# Patient Record
Sex: FEMALE | Race: WHITE | NOT HISPANIC OR LATINO | Employment: UNEMPLOYED | URBAN - METROPOLITAN AREA
[De-identification: names, ages, dates, MRNs, and addresses within clinical notes are randomized per-mention and may not be internally consistent; named-entity substitution may affect disease eponyms.]

---

## 2023-07-11 LAB
ALANINE AMINOTRANSFERASE (SGPT) (U/L) IN SER/PLAS: 17 U/L (ref 7–45)
ALBUMIN (G/DL) IN SER/PLAS: 4.1 G/DL (ref 3.4–5)
ALKALINE PHOSPHATASE (U/L) IN SER/PLAS: 85 U/L (ref 33–136)
ANION GAP IN SER/PLAS: 11 MMOL/L (ref 10–20)
APPEARANCE, URINE: CLEAR
ASPARTATE AMINOTRANSFERASE (SGOT) (U/L) IN SER/PLAS: 17 U/L (ref 9–39)
BACTERIA, URINE: ABNORMAL /HPF
BASOPHILS (10*3/UL) IN BLOOD BY AUTOMATED COUNT: 0.03 X10E9/L (ref 0–0.1)
BASOPHILS/100 LEUKOCYTES IN BLOOD BY AUTOMATED COUNT: 0.5 % (ref 0–2)
BILIRUBIN TOTAL (MG/DL) IN SER/PLAS: 0.8 MG/DL (ref 0–1.2)
BILIRUBIN, URINE: NEGATIVE
BLOOD, URINE: NEGATIVE
CALCIDIOL (25 OH VITAMIN D3) (NG/ML) IN SER/PLAS: 36 NG/ML
CALCIUM (MG/DL) IN SER/PLAS: 9.3 MG/DL (ref 8.6–10.6)
CARBON DIOXIDE, TOTAL (MMOL/L) IN SER/PLAS: 29 MMOL/L (ref 21–32)
CHLORIDE (MMOL/L) IN SER/PLAS: 102 MMOL/L (ref 98–107)
CHOLESTEROL (MG/DL) IN SER/PLAS: 175 MG/DL (ref 0–199)
CHOLESTEROL IN HDL (MG/DL) IN SER/PLAS: 50.9 MG/DL
CHOLESTEROL/HDL RATIO: 3.4
COLOR, URINE: ABNORMAL
CREATININE (MG/DL) IN SER/PLAS: 0.78 MG/DL (ref 0.5–1.05)
EOSINOPHILS (10*3/UL) IN BLOOD BY AUTOMATED COUNT: 0.16 X10E9/L (ref 0–0.7)
EOSINOPHILS/100 LEUKOCYTES IN BLOOD BY AUTOMATED COUNT: 2.8 % (ref 0–6)
ERYTHROCYTE DISTRIBUTION WIDTH (RATIO) BY AUTOMATED COUNT: 12.8 % (ref 11.5–14.5)
ERYTHROCYTE MEAN CORPUSCULAR HEMOGLOBIN CONCENTRATION (G/DL) BY AUTOMATED: 32.8 G/DL (ref 32–36)
ERYTHROCYTE MEAN CORPUSCULAR VOLUME (FL) BY AUTOMATED COUNT: 94 FL (ref 80–100)
ERYTHROCYTES (10*6/UL) IN BLOOD BY AUTOMATED COUNT: 4.11 X10E12/L (ref 4–5.2)
GFR FEMALE: 87 ML/MIN/1.73M2
GLUCOSE (MG/DL) IN SER/PLAS: 92 MG/DL (ref 74–99)
GLUCOSE, URINE: NEGATIVE MG/DL
HEMATOCRIT (%) IN BLOOD BY AUTOMATED COUNT: 38.7 % (ref 36–46)
HEMOGLOBIN (G/DL) IN BLOOD: 12.7 G/DL (ref 12–16)
IMMATURE GRANULOCYTES/100 LEUKOCYTES IN BLOOD BY AUTOMATED COUNT: 0.2 % (ref 0–0.9)
KETONES, URINE: NEGATIVE MG/DL
LDL: 91 MG/DL (ref 0–99)
LEUKOCYTE ESTERASE, URINE: ABNORMAL
LEUKOCYTES (10*3/UL) IN BLOOD BY AUTOMATED COUNT: 5.8 X10E9/L (ref 4.4–11.3)
LYMPHOCYTES (10*3/UL) IN BLOOD BY AUTOMATED COUNT: 2.68 X10E9/L (ref 1.2–4.8)
LYMPHOCYTES/100 LEUKOCYTES IN BLOOD BY AUTOMATED COUNT: 46.4 % (ref 13–44)
MONOCYTES (10*3/UL) IN BLOOD BY AUTOMATED COUNT: 0.5 X10E9/L (ref 0.1–1)
MONOCYTES/100 LEUKOCYTES IN BLOOD BY AUTOMATED COUNT: 8.7 % (ref 2–10)
NEUTROPHILS (10*3/UL) IN BLOOD BY AUTOMATED COUNT: 2.39 X10E9/L (ref 1.2–7.7)
NEUTROPHILS/100 LEUKOCYTES IN BLOOD BY AUTOMATED COUNT: 41.4 % (ref 40–80)
NITRITE, URINE: NEGATIVE
NRBC (PER 100 WBCS) BY AUTOMATED COUNT: 0 /100 WBC (ref 0–0)
PH, URINE: 7 (ref 5–8)
PLATELETS (10*3/UL) IN BLOOD AUTOMATED COUNT: 219 X10E9/L (ref 150–450)
POTASSIUM (MMOL/L) IN SER/PLAS: 4.3 MMOL/L (ref 3.5–5.3)
PROTEIN TOTAL: 6.9 G/DL (ref 6.4–8.2)
PROTEIN, URINE: NEGATIVE MG/DL
RBC, URINE: 1 /HPF (ref 0–5)
SODIUM (MMOL/L) IN SER/PLAS: 138 MMOL/L (ref 136–145)
SPECIFIC GRAVITY, URINE: 1 (ref 1–1.03)
SQUAMOUS EPITHELIAL CELLS, URINE: <1 /HPF
THYROTROPIN (MIU/L) IN SER/PLAS BY DETECTION LIMIT <= 0.05 MIU/L: 1.96 MIU/L (ref 0.44–3.98)
TRIGLYCERIDE (MG/DL) IN SER/PLAS: 168 MG/DL (ref 0–149)
UREA NITROGEN (MG/DL) IN SER/PLAS: 14 MG/DL (ref 6–23)
UROBILINOGEN, URINE: <2 MG/DL (ref 0–1.9)
VLDL: 34 MG/DL (ref 0–40)
WBC, URINE: 1 /HPF (ref 0–5)

## 2023-07-15 LAB — URINE CULTURE: ABNORMAL

## 2023-07-26 LAB
APPEARANCE, URINE: CLEAR
BACTERIA, URINE: ABNORMAL /HPF
BILIRUBIN, URINE: NEGATIVE
BLOOD, URINE: ABNORMAL
COLOR, URINE: YELLOW
GLUCOSE, URINE: NEGATIVE MG/DL
KETONES, URINE: NEGATIVE MG/DL
LEUKOCYTE ESTERASE, URINE: ABNORMAL
MUCUS, URINE: ABNORMAL /LPF
NITRITE, URINE: NEGATIVE
PH, URINE: 7 (ref 5–8)
PROTEIN, URINE: NEGATIVE MG/DL
RBC, URINE: 29 /HPF (ref 0–5)
SPECIFIC GRAVITY, URINE: 1.01 (ref 1–1.03)
UROBILINOGEN, URINE: <2 MG/DL (ref 0–1.9)
WBC, URINE: 7 /HPF (ref 0–5)

## 2023-07-28 LAB — URINE CULTURE: ABNORMAL

## 2023-08-16 LAB
APPEARANCE, URINE: CLEAR
BILIRUBIN, URINE: NEGATIVE
BLOOD, URINE: NEGATIVE
COLOR, URINE: YELLOW
GLUCOSE, URINE: NEGATIVE MG/DL
KETONES, URINE: NEGATIVE MG/DL
LEUKOCYTE ESTERASE, URINE: NEGATIVE
NITRITE, URINE: NEGATIVE
PH, URINE: 7 (ref 5–8)
PROTEIN, URINE: NEGATIVE MG/DL
SPECIFIC GRAVITY, URINE: 1.01 (ref 1–1.03)
UROBILINOGEN, URINE: <2 MG/DL (ref 0–1.9)

## 2023-08-17 LAB — URINE CULTURE: NORMAL

## 2023-11-07 ENCOUNTER — LAB (OUTPATIENT)
Dept: LAB | Facility: LAB | Age: 58
End: 2023-11-07
Payer: COMMERCIAL

## 2023-11-07 DIAGNOSIS — E55.9 VITAMIN D DEFICIENCY, UNSPECIFIED: Primary | ICD-10-CM

## 2023-11-07 LAB
25(OH)D3 SERPL-MCNC: 32 NG/ML (ref 30–100)
ALBUMIN SERPL BCP-MCNC: 4.5 G/DL (ref 3.4–5)
ALP SERPL-CCNC: 78 U/L (ref 33–136)
ALT SERPL W P-5'-P-CCNC: 19 U/L (ref 7–45)
ANION GAP SERPL CALC-SCNC: 15 MMOL/L (ref 10–20)
AST SERPL W P-5'-P-CCNC: 17 U/L (ref 9–39)
BILIRUB SERPL-MCNC: 0.9 MG/DL (ref 0–1.2)
BUN SERPL-MCNC: 13 MG/DL (ref 6–23)
CALCIUM SERPL-MCNC: 9.7 MG/DL (ref 8.6–10.6)
CHLORIDE SERPL-SCNC: 103 MMOL/L (ref 98–107)
CHOLEST SERPL-MCNC: 170 MG/DL (ref 0–199)
CHOLESTEROL/HDL RATIO: 3.2
CO2 SERPL-SCNC: 28 MMOL/L (ref 21–32)
CREAT SERPL-MCNC: 0.74 MG/DL (ref 0.5–1.05)
GFR SERPL CREATININE-BSD FRML MDRD: >90 ML/MIN/1.73M*2
GLUCOSE SERPL-MCNC: 92 MG/DL (ref 74–99)
HDLC SERPL-MCNC: 52.7 MG/DL
LDLC SERPL CALC-MCNC: 84 MG/DL
NON HDL CHOLESTEROL: 117 MG/DL (ref 0–149)
POTASSIUM SERPL-SCNC: 4.6 MMOL/L (ref 3.5–5.3)
PROT SERPL-MCNC: 7.2 G/DL (ref 6.4–8.2)
SODIUM SERPL-SCNC: 141 MMOL/L (ref 136–145)
TRIGL SERPL-MCNC: 168 MG/DL (ref 0–149)
VLDL: 34 MG/DL (ref 0–40)

## 2023-11-07 PROCEDURE — 36415 COLL VENOUS BLD VENIPUNCTURE: CPT

## 2023-11-07 PROCEDURE — 82306 VITAMIN D 25 HYDROXY: CPT

## 2023-11-07 PROCEDURE — 80061 LIPID PANEL: CPT

## 2023-11-07 PROCEDURE — 80053 COMPREHEN METABOLIC PANEL: CPT

## 2023-11-09 ENCOUNTER — OFFICE VISIT (OUTPATIENT)
Dept: PRIMARY CARE | Facility: CLINIC | Age: 58
End: 2023-11-09
Payer: COMMERCIAL

## 2023-11-09 VITALS — DIASTOLIC BLOOD PRESSURE: 82 MMHG | BODY MASS INDEX: 30.08 KG/M2 | SYSTOLIC BLOOD PRESSURE: 124 MMHG | WEIGHT: 178 LBS

## 2023-11-09 DIAGNOSIS — G47.9 SLEEP DISTURBANCE: Primary | ICD-10-CM

## 2023-11-09 DIAGNOSIS — E78.5 HYPERLIPIDEMIA, UNSPECIFIED HYPERLIPIDEMIA TYPE: ICD-10-CM

## 2023-11-09 DIAGNOSIS — E55.9 VITAMIN D DEFICIENCY: ICD-10-CM

## 2023-11-09 PROCEDURE — 1036F TOBACCO NON-USER: CPT | Performed by: INTERNAL MEDICINE

## 2023-11-09 PROCEDURE — 99214 OFFICE O/P EST MOD 30 MIN: CPT | Performed by: INTERNAL MEDICINE

## 2023-11-09 RX ORDER — CETIRIZINE HYDROCHLORIDE 10 MG/1
10 TABLET, CHEWABLE ORAL
COMMUNITY
Start: 2013-01-31

## 2023-11-09 RX ORDER — ERGOCALCIFEROL 1.25 MG/1
50000 CAPSULE ORAL
Qty: 12 CAPSULE | Refills: 0 | Status: SHIPPED | OUTPATIENT
Start: 2023-11-09 | End: 2024-01-03

## 2023-11-09 RX ORDER — TRAZODONE HYDROCHLORIDE 50 MG/1
50 TABLET ORAL NIGHTLY
COMMUNITY
Start: 2023-11-01 | End: 2024-03-07

## 2023-11-09 RX ORDER — FLUNISOLIDE 0.25 MG/ML
SOLUTION NASAL
COMMUNITY
Start: 2019-04-12

## 2023-11-09 RX ORDER — MULTIVITAMIN
TABLET ORAL
COMMUNITY

## 2023-11-09 RX ORDER — FLUTICASONE PROPIONATE 50 MCG
2 SPRAY, SUSPENSION (ML) NASAL
COMMUNITY
Start: 2013-04-22

## 2023-11-09 RX ORDER — METRONIDAZOLE 7.5 MG/G
LOTION TOPICAL
COMMUNITY
Start: 2019-03-08

## 2023-11-09 NOTE — PROGRESS NOTES
Subjective   Patient ID: Monique Guerrero is a 58 y.o. female who presents for No chief complaint on file..    HPI  Patient in for a visit  Gets over 10,000 steps a day   Dtr having difficulty with SADD had to pick her up from her program she was out of sorts , and her parents are ailing as well  Works for her  CPA   Vit d low already on mvi    Review of Systems  General: Denies fever, chills, night sweats,  Eyes: Negative for recent visual changes  Ears, Nose, Throat :  Negative for hearing changes, sinus discomfort  Dermatologic: Negative for new skin conditions, rash  Respiratory: Negative for wheezing, shortness of breath, cough  Cardiovascular: Negative for chest pain, palpitations, or leg swelling  Gastrointestinal: Negative for nausea/vomiting, abdominal pain, changes in bowel habits  Genitourinary URINARY INCONTINENCE except with coughing sneezing intermittently  urgency , frequency, discomfort   Musculoskeletal: see hpi  Neurological: Negative for headaches, dizziness    Previous history  Past Medical History:   Diagnosis Date    Allergic rhinitis, unspecified 05/08/2020    Allergic rhinitis    Chronic rhinitis 05/08/2020    Rhinitis    Chronic rhinitis     Rhinitis, chronic    COVID-19 12/01/2022    COVID-19 virus infection    Encounter for immunization 11/13/2020    Need for shingles vaccine    Overweight 01/14/2022    Overweight    Personal history of other diseases of the circulatory system     History of varicose veins of lower extremity    Personal history of other malignant neoplasm of skin     History of basal cell carcinoma (BCC)    Personal history of other medical treatment     History of mammogram    Personal history of other venous thrombosis and embolism     H/O blood clots    Pure hyperglyceridemia 01/14/2022    High triglycerides    Rosacea, unspecified 01/14/2022    Rosacea    Varicella without complication     Varicella without complication     Past Surgical History:   Procedure  Laterality Date     SECTION, CLASSIC  2014     Section    OTHER SURGICAL HISTORY  2017    Venous Sclerosing By Injection    OTHER SURGICAL HISTORY  2018    Endovenous Ablation Of Incompetent Vein Laser    OTHER SURGICAL HISTORY  2017    Leg Repair     Social History     Tobacco Use    Smoking status: Never    Smokeless tobacco: Never     No family history on file.  Allergies   Allergen Reactions    Delsym Rash    Dextromethorphan Hbr Rash    Pseudoephedrine Rash     fever, rash     Current Outpatient Medications   Medication Instructions    CALCIUM CITRATE-VITAMIN D3 ORAL oral    cetirizine (ZYRTEC) 10 mg, oral, Daily RT    fish oil concentrate (Omega-3) 120-180 mg capsule oral    flunisolide (Nasalide) 25 mcg (0.025 %) spray,non-aerosol nasal, Daily RT    fluticasone (Flonase) 50 mcg/actuation nasal spray 2 sprays, Does not apply, Daily RT    L. acidophilus/Bifid. animalis 32 billion cell capsule oral    metroNIDAZOLE (Metrolotion) 0.75 % lotion lotion Apply sparingly twice daily.    multivitamin tablet oral       Objective       Physical Exam  Vital Signs: as recorded above  General: Well groomed, well nourished   Orientation:  Alert , oriented to time, place , and person   Mood and Affect:  Cooperative , no apparent distress normal affect  Skin: Good color, good turgor  Eyes: Extra ocular muscle movements intact, anicteric sclerae  Neck: Supple, full range of movement  Chest: Normal breath sounds, normal chest wall exam, symmetric, good air entry, clear to auscultation  Heart: Regular rate and rhythm, without murmur, gallop, or rubs  BACK:  no CTLS spine tenderness, no flank tenderness  Extremities: full range of movement  bilateral UE and bilateral LE,  no lower extremity edema  Neurological: Alert, oriented, cranial nerves II-XII intact except for visual acuity  Sensation:  Intact   Gait: normal steady      Assessment/Plan   Monique Guerrero is a 58 y.o. female who  presents for the concerns below:    Problem List Items Addressed This Visit    None    HYPERCHOLESTEROLEMIA PLAN: stable  continue current fish oil   Follow low cholesterol diet, triglycerides 168  encouraged high omega 3 fatty acid intake in diet, exercise, weight control. . Will recheck / Obtain AST/ALT, fasting lipid profileCoenzyme Q10 200 mg a day if able to help increase HDL.    Skin changes rosacea just saw derm DR Jaime in October     Tdap   VITAMIN D DEFICIENCY low levels ,will put in order for high dose weekly 50,000 IU , then take Vitamin D3 2000 iu daily after 3 month course,   recheck vitamin D level in 4-5 months if below 26 will restart high dose again for another course , discussed other supplements or medications that may go through liver  follow-up as planned         Discussed with:   Return in : 4 months, labs to do prior to that visit     Portions of this note were generated using digital voice recognition software, and may contain grammatical errors       Aurora Winn MD  11/09/23  9:46 AM

## 2023-11-30 ENCOUNTER — OFFICE VISIT (OUTPATIENT)
Dept: OBSTETRICS AND GYNECOLOGY | Facility: CLINIC | Age: 58
End: 2023-11-30
Payer: COMMERCIAL

## 2023-11-30 VITALS
BODY MASS INDEX: 30.32 KG/M2 | DIASTOLIC BLOOD PRESSURE: 80 MMHG | HEIGHT: 65 IN | WEIGHT: 182 LBS | SYSTOLIC BLOOD PRESSURE: 132 MMHG

## 2023-11-30 DIAGNOSIS — Z12.31 ENCOUNTER FOR SCREENING MAMMOGRAM FOR BREAST CANCER: ICD-10-CM

## 2023-11-30 DIAGNOSIS — Z01.419 ENCOUNTER FOR WELL WOMAN EXAM WITH ROUTINE GYNECOLOGICAL EXAM: Primary | ICD-10-CM

## 2023-11-30 PROCEDURE — 99396 PREV VISIT EST AGE 40-64: CPT | Performed by: OBSTETRICS & GYNECOLOGY

## 2023-11-30 PROCEDURE — 1036F TOBACCO NON-USER: CPT | Performed by: OBSTETRICS & GYNECOLOGY

## 2023-11-30 ASSESSMENT — ENCOUNTER SYMPTOMS
CONSTITUTIONAL NEGATIVE: 1
RESPIRATORY NEGATIVE: 1
ENDOCRINE NEGATIVE: 1
CARDIOVASCULAR NEGATIVE: 1
SLEEP DISTURBANCE: 1
MUSCULOSKELETAL NEGATIVE: 1
NEUROLOGICAL NEGATIVE: 1
EYES NEGATIVE: 1
HEMATOLOGIC/LYMPHATIC NEGATIVE: 1
GASTROINTESTINAL NEGATIVE: 1

## 2023-11-30 NOTE — PROGRESS NOTES
HPI    PAP 7-7-22 NEG HPV NEG  MAMMO 7-21-22  DEXA NEVER  COLON 6-21-18  Last edited by Onofre Nino MA on 11/30/2023  2:46 PM.

## 2023-11-30 NOTE — PROGRESS NOTES
"PAP 22 NEG HPV NEG   MAMMO 22   DEXA NEVER   COLON 18     SUBJECTIVE    HPI        59 yo  for routine well woman visit.    Last visit 2022 for RA.  Menopausal since age 48. Denies any bleeding.  Denies any other intervening medical or surgical issues.   , no smoke, occ ETOH.  Stressors related to aging parents and health issues.     Review of Systems   Constitutional: Negative.  Unexpected weight change: wt gain.   HENT: Negative.     Eyes: Negative.    Respiratory: Negative.     Cardiovascular: Negative.    Gastrointestinal: Negative.    Endocrine: Negative.    Genitourinary: Negative.    Musculoskeletal: Negative.    Skin: Negative.    Neurological: Negative.    Hematological: Negative.    Psychiatric/Behavioral:  Positive for sleep disturbance.    All other systems reviewed and are negative.    OBJECTIVE    /80   Ht 1.638 m (5' 4.5\")   Wt 82.6 kg (182 lb)   BMI 30.76 kg/m²     Physical Exam     Constitutional: Alert and in no acute distress. Well developed, well nourished   Head and Face: Head and face: normal   Eyes: Normal external exam - nonicteric sclera, extraocular movements intact (EOMI) and no ptosis.   Ears, Nose, Mouth, and Throat: External inspection of ears and nose: normal   Neck: no neck asymmetry. Supple and thyroid not enlarged and there were no palpable thyroid nodules   Cardiovascular: Heart rate and rhythm were normal, normal S1 and S2, no gallops, and no murmurs   Pulmonary: No respiratory distress and clear bilateral breath sounds   Chest: Breasts: normal appearance, no nipple discharge and no skin changes and palpation of breasts and axillae: no palpable mass and no axillary lymphadenopathy   Abdomen: soft nontender; no abdominal mass palpated, no organomegaly and no hernias   Genitourinary: external genitalia: normal, no inguinal lymphadenopathy, Bartholin's urethral and [place]'s glands: normal, urethra: normal, bladder: normal on palpation and perianal " area: normal   Vagina: normal.   Cervix: Normal appearing without lesions.  Uterus: Normal, mobile, nontender.  Right Adnexa/parametria: Normal.   Left Adnexa/parametria: Normal.   Skin: normal skin color and pigmentation, normal skin turgor, and no rash.   Psychiatric: alert and oriented x 3., affect normal to patient baseline and mood: appropriate        ASSESSMENT/PLAN  1. Encounter for well woman exam with routine gynecological exam  No pap done. Last pap 2022 was normal and HPV negative.    2. Encounter for screening mammogram for breast cancer  - BI mammo bilateral screening tomosynthesis;     Declines bone density at this time.     Lillie Beck MD

## 2023-12-08 ENCOUNTER — ANCILLARY PROCEDURE (OUTPATIENT)
Dept: RADIOLOGY | Facility: CLINIC | Age: 58
End: 2023-12-08
Payer: COMMERCIAL

## 2023-12-08 DIAGNOSIS — Z12.31 ENCOUNTER FOR SCREENING MAMMOGRAM FOR BREAST CANCER: ICD-10-CM

## 2023-12-08 PROCEDURE — 77067 SCR MAMMO BI INCL CAD: CPT | Performed by: STUDENT IN AN ORGANIZED HEALTH CARE EDUCATION/TRAINING PROGRAM

## 2023-12-08 PROCEDURE — 77063 BREAST TOMOSYNTHESIS BI: CPT

## 2023-12-08 PROCEDURE — 77063 BREAST TOMOSYNTHESIS BI: CPT | Performed by: STUDENT IN AN ORGANIZED HEALTH CARE EDUCATION/TRAINING PROGRAM

## 2024-01-02 DIAGNOSIS — E55.9 VITAMIN D DEFICIENCY: ICD-10-CM

## 2024-01-03 RX ORDER — ERGOCALCIFEROL 1.25 MG/1
50000 CAPSULE ORAL
Qty: 12 CAPSULE | Refills: 0 | Status: SHIPPED | OUTPATIENT
Start: 2024-01-03 | End: 2024-04-19

## 2024-02-29 ENCOUNTER — HOSPITAL ENCOUNTER (OUTPATIENT)
Dept: RADIOLOGY | Facility: CLINIC | Age: 59
Discharge: HOME | End: 2024-02-29
Payer: COMMERCIAL

## 2024-02-29 ENCOUNTER — OFFICE VISIT (OUTPATIENT)
Dept: ORTHOPEDIC SURGERY | Facility: CLINIC | Age: 59
End: 2024-02-29
Payer: COMMERCIAL

## 2024-02-29 VITALS — HEIGHT: 64 IN | WEIGHT: 188.4 LBS | BODY MASS INDEX: 32.17 KG/M2

## 2024-02-29 DIAGNOSIS — M25.571 RIGHT ANKLE PAIN, UNSPECIFIED CHRONICITY: ICD-10-CM

## 2024-02-29 DIAGNOSIS — M76.70 PERONEAL TENDINITIS, UNSPECIFIED LATERALITY: Primary | ICD-10-CM

## 2024-02-29 PROCEDURE — 73610 X-RAY EXAM OF ANKLE: CPT | Mod: RIGHT SIDE | Performed by: RADIOLOGY

## 2024-02-29 PROCEDURE — 73610 X-RAY EXAM OF ANKLE: CPT | Mod: RT

## 2024-02-29 PROCEDURE — 1036F TOBACCO NON-USER: CPT | Performed by: ORTHOPAEDIC SURGERY

## 2024-02-29 PROCEDURE — 99214 OFFICE O/P EST MOD 30 MIN: CPT | Performed by: ORTHOPAEDIC SURGERY

## 2024-02-29 PROCEDURE — 99204 OFFICE O/P NEW MOD 45 MIN: CPT | Performed by: ORTHOPAEDIC SURGERY

## 2024-02-29 ASSESSMENT — PAIN - FUNCTIONAL ASSESSMENT: PAIN_FUNCTIONAL_ASSESSMENT: 0-10

## 2024-02-29 ASSESSMENT — PAIN SCALES - GENERAL: PAINLEVEL_OUTOF10: 7

## 2024-02-29 ASSESSMENT — PAIN DESCRIPTION - DESCRIPTORS: DESCRIPTORS: ACHING;THROBBING

## 2024-02-29 NOTE — PROGRESS NOTES
Patient was reviewed and discussed with NICHOLE and/or orthopedic resident.  Patient was seen and evaluated in conjunction with NICHOLE and/or orthopedic resident. Findings and treatment plan were discussed and approved by myself, Dr. Schmidt.    On exam:  WD/WN overweight female  A+O X3  NAD  No lymphedema  Inspection of both feet and ankles show symmetric arches.   Able to STR bilaterally.   5/5 strength in all 4 planes.   Pain over peroneal tendons with inversion of right hindfoot.  Mild swelling over peroneal sheath distally.  No reproducible pain.  Sensation intact to LT.   Good pulses.   Stable anterior drawer.  No peroneal subluxation.    (-) [place].     I personally reviewed the following radiographic exams: Right ankle unremarkable.    Assessment: Right ankle pain, apparent peroneal tendon dysfunction.    Plan: Discussed nonoperative and operative options in detail.   Risk and benefits discussed in detail. All questions answered today.  Recovery timeline and expectations discussed in detail.  Reassured patient.  Recommend course of anti-inflammatory for the next few weeks.  Continue ice.  Recommend a course of physical therapy.  Discussed possible advanced imaging if pain continues.

## 2024-02-29 NOTE — PROGRESS NOTES
HPI:  58 y/o female presenting with 3 week history of right ankle pain localized laterally just distal and posterior to the lateral malleolus. She denies any specific injury but thinks she may have overused the ankle while exercising and walking her dog. She has a history of multiple right ankle sprains remotely but no recent injuries or issues with the ankle. She has tried resting and icing the ankle without sustained relief. She has not tried PT or NSAIDs at this time.    Exam: right ankle  - skin intact  - painless dorsiflexion, plantarflexion, eversion and inversion with 5/5 strength  - mild pain with resisted inversion  - non-tender to palpation   - able to do unassisted heel raise  - stable anterior and posterior drawer  - palpable DP pulse  - sensation intact throughout

## 2024-03-06 DIAGNOSIS — G47.9 SLEEP DISORDER, UNSPECIFIED: ICD-10-CM

## 2024-03-07 ENCOUNTER — APPOINTMENT (OUTPATIENT)
Dept: PRIMARY CARE | Facility: CLINIC | Age: 59
End: 2024-03-07
Payer: COMMERCIAL

## 2024-03-07 RX ORDER — TRAZODONE HYDROCHLORIDE 50 MG/1
50 TABLET ORAL NIGHTLY
Qty: 90 TABLET | Refills: 0 | Status: SHIPPED | OUTPATIENT
Start: 2024-03-07

## 2024-03-27 ENCOUNTER — EVALUATION (OUTPATIENT)
Dept: PHYSICAL THERAPY | Facility: CLINIC | Age: 59
End: 2024-03-27
Payer: COMMERCIAL

## 2024-03-27 DIAGNOSIS — M76.70 PERONEAL TENDINITIS, UNSPECIFIED LATERALITY: ICD-10-CM

## 2024-03-27 PROCEDURE — 97110 THERAPEUTIC EXERCISES: CPT | Mod: GP | Performed by: PHYSICAL THERAPIST

## 2024-03-27 PROCEDURE — 97161 PT EVAL LOW COMPLEX 20 MIN: CPT | Mod: GP | Performed by: PHYSICAL THERAPIST

## 2024-03-27 ASSESSMENT — ENCOUNTER SYMPTOMS
LOSS OF SENSATION IN FEET: 0
OCCASIONAL FEELINGS OF UNSTEADINESS: 0
DEPRESSION: 0

## 2024-03-27 NOTE — PROGRESS NOTES
Physical Therapy    Physical Therapy Evaluation and Treatment      Patient Name: Monique Guerrero  MRN: [id number]  Today's Date: 3/27/2024  Visit: 1  Insurance: Reviewed  Physician: Manuel Schmidt  PT Evaluation Time Entry  PT Evaluation (Low) Time Entry: 25  PT Therapeutic Procedures Time Entry  Therapeutic Exercise Time Entry: 10  Time Calculation  Start Time: 1120  Stop Time: 1155  Time Calculation (min): 35 min    Assessment: Patient seen in PT for Initial Evaluation for right ankle pain secondary to .   Patient presents with postural deviation  decreased ankle ROM , decreased ankle strength, ankle tenderness, gait deviation and decreased balance.  Functionally, patient  unable to take long walks.  Patient rates LEFS at 60%.    PT Assessment  Rehab Prognosis: Good     Plan:  Continue with POC  General fitness, calf stretches, ankle ROM/strength, CKC, balance, GT, CP  OP PT Plan  PT Plan: Skilled PT  PT Frequency: 1 time per week  Duration: 6  Rehab Potential: Good  Plan of Care Agreement: Patient    Current Problem:   1. Peroneal tendinitis, unspecified laterality  Referral to Physical Therapy    Follow Up In Physical Therapy          Subjective    General: Patient with a history of right foot and ankle pain for 2 months.  Onset was insidious - loosely relates to walking more.  Patient treated with advil - helps.  Patient complains of pain in the region of the right med and lateral ankle medial foot, ache/throb pain, 8/10 at worst last 7 days.  Patient's pain is worse with walking, up and down inclines and walking on uneven surfaces.  Functionally, patient is unable to walk long distances.  Works as an independent provider.  Xrays normal       Precautions: history of basal cell carcenoma - not being treated  Precautions  STEADI Fall Risk Score (The score of 4 or more indicates an increased risk of falling): 0  Prior Level of Function: no limitations - able to walk 2-3 miles       Objective     Posture: pronation  - out toeing right  LE ROM: ankle DF 10, PF 50, INV 20, EV 20  LE strength:  dorsiflexion, 4/5, plantarflexion 4/5, IN 4/5, EV 5/5   Gait: out toeing, pronation on left   Balance: SLS on left 5 sec, on right 10 sec  Min swelling right foot and ankle  Tender to palpation at PTT and medial and lateral ankle  Outcome Measures:  Other Measures  Lower Extremity Funtional Score (LEFS): 60%     Treatments:  Patient instructed in HEP including: ankle ROM 20X each, ankle alphabet 1X, resisted ankle DF  and INV with yellow theraband 15 X each  (10 minutes)  CP to the right ankle 10 minutes    EDUCATION: HEP        Goals:  Active       PT Problem       PT Goal 1       Start:  03/27/24    Expected End:  06/27/24       Ankle pain 3/10 or less         PT Goal 2       Start:  03/27/24    Expected End:  06/27/24       Improve LEFS by 20%         PT Goal 3       Start:  03/27/24    Expected End:  06/27/24       Normal gait          PT Goal 4       Start:  03/27/24    Expected End:  06/27/24       Ankle strength 5/5

## 2024-04-01 DIAGNOSIS — E55.9 VITAMIN D DEFICIENCY: ICD-10-CM

## 2024-04-02 ENCOUNTER — APPOINTMENT (OUTPATIENT)
Dept: PRIMARY CARE | Facility: CLINIC | Age: 59
End: 2024-04-02
Payer: COMMERCIAL

## 2024-04-02 RX ORDER — ERGOCALCIFEROL 1.25 MG/1
50000 CAPSULE ORAL
Qty: 12 CAPSULE | Refills: 0 | OUTPATIENT
Start: 2024-04-02 | End: 2024-06-25

## 2024-04-08 ENCOUNTER — TREATMENT (OUTPATIENT)
Dept: PHYSICAL THERAPY | Facility: CLINIC | Age: 59
End: 2024-04-08
Payer: COMMERCIAL

## 2024-04-08 DIAGNOSIS — M76.70 PERONEAL TENDINITIS, UNSPECIFIED LATERALITY: ICD-10-CM

## 2024-04-08 PROCEDURE — 97110 THERAPEUTIC EXERCISES: CPT | Mod: GP,CQ

## 2024-04-08 ASSESSMENT — PAIN SCALES - GENERAL: PAINLEVEL_OUTOF10: 0 - NO PAIN

## 2024-04-08 ASSESSMENT — PAIN - FUNCTIONAL ASSESSMENT: PAIN_FUNCTIONAL_ASSESSMENT: 0-10

## 2024-04-08 NOTE — PROGRESS NOTES
"Physical Therapy    Physical Therapy Treatment    Patient Name: Monique Guerrero  MRN: [id number]  Today's Date: 4/8/2024  Time Calculation  Start Time: 1015  Stop Time: 1100  Time Calculation (min): 45 min  Visit 2    Assessment:  PT Assessment  Evaluation/Treatment Tolerance: Patient tolerated treatment well  Reports 2/10 right foot arch pain when performing standing calf raises, states \"not bad\". No pain with introduction of SLS, moderately challenging, fair balance. No other reported concerns or issues with remaining exercises, all completed well. Understands HEP, reviewed addition of short bouts of SLS as long as symptoms remain without pain.     Plan:   Continue treatment per current POC  Current Problem  1. Peroneal tendinitis, unspecified laterality  Follow Up In Physical Therapy      General        Subjective    No pain at this time, states that her pain has decreased since starting the exercises given during her evaluation. Performing HEP 3 times a day. Wants to get back to walking the dog and using her treadmill.    Pain  Pain Assessment  Pain Assessment: 0-10  Pain Score: 0 - No pain    Objective     Treatments:  Therapeutic Exercise  Therapeutic Exercise Performed: Yes  Seated strap calf stretching 3 x 30 sec  ankle ROM 20X each  ankle alphabet x 1  resisted ankle DF, INV, EV with yellow theraband x 20  Seated foot arches x 20  Long duration PF holds Blue TB 5 x 10 sec  Calf raise x 15  SLS 10 x 3 sec each    CP to the right ankle x 6 minutes    OP EDUCATION:     Goals:  Active       PT Problem       PT Goal 1       Start:  03/27/24    Expected End:  06/27/24       Ankle pain 3/10 or less         PT Goal 2       Start:  03/27/24    Expected End:  06/27/24       Improve LEFS by 20%         PT Goal 3       Start:  03/27/24    Expected End:  06/27/24       Normal gait          PT Goal 4       Start:  03/27/24    Expected End:  06/27/24       Ankle strength 5/5           "

## 2024-04-16 ENCOUNTER — TREATMENT (OUTPATIENT)
Dept: PHYSICAL THERAPY | Facility: CLINIC | Age: 59
End: 2024-04-16
Payer: COMMERCIAL

## 2024-04-16 DIAGNOSIS — M76.70 PERONEAL TENDINITIS, UNSPECIFIED LATERALITY: ICD-10-CM

## 2024-04-16 PROCEDURE — 97110 THERAPEUTIC EXERCISES: CPT | Mod: GP,CQ

## 2024-04-16 ASSESSMENT — PAIN - FUNCTIONAL ASSESSMENT: PAIN_FUNCTIONAL_ASSESSMENT: 0-10

## 2024-04-16 ASSESSMENT — PAIN SCALES - GENERAL: PAINLEVEL_OUTOF10: 0 - NO PAIN

## 2024-04-16 NOTE — PROGRESS NOTES
Physical Therapy    Physical Therapy Treatment    Patient Name: Monique Guerrero  MRN: [id number]  Today's Date: 4/16/2024  Time Calculation  Start Time: 1015  Stop Time: 1100  Time Calculation (min): 45 min  PT Therapeutic Procedures Time Entry  Therapeutic Exercise Time Entry: 45        Visit 3    Assessment:  PT Assessment  Evaluation/Treatment Tolerance: Patient tolerated treatment well  With good response to stepper, lunge stretching, baps board and wedge stretching, no reported issues. Felt soleus raises were challenging. Has demonstrated improved strength through this session as well as slightly improved SLS durations. No pain when leaving today's treatment session.     Plan:   Continue treatment per current POC  Current Problem  1. Peroneal tendinitis, unspecified laterality  Follow Up In Physical Therapy      General        Subjective    Tightness through the medial side of ankle/calf musculature this morning; no pain. Complaint with HEP daily, feels her SLS has improved. Doing well overall with no other complaints or concerns at the moment.     Pain  Pain Assessment  Pain Assessment: 0-10  Pain Score: 0 - No pain    Objective     Treatments:  Therapeutic Exercise  Therapeutic Exercise Performed: Yes  Stepper x 7 min   Lunge on step focusing on ankle mobility x 20  Wedge calf stretching 3 x 20 sec  BAPS board fwd x 20 [place] L/R x 15  resisted ankle DF, INV, EV with yellow theraband x 20  Seated foot arches x 20  Long duration PF holds Blue TB 5 x 10 sec  Calf raise INV/Ev x 10 each  Soleus raise x 15  Mini squat on foam x 15  SLS 10 x 3 sec each    (Not today)  CP to the right ankle x 6 minutes    OP EDUCATION:     Goals:  Active       PT Problem       PT Goal 1       Start:  03/27/24    Expected End:  06/27/24       Ankle pain 3/10 or less         PT Goal 2       Start:  03/27/24    Expected End:  06/27/24       Improve LEFS by 20%         PT Goal 3       Start:  03/27/24    Expected End:  06/27/24        Normal gait          PT Goal 4       Start:  03/27/24    Expected End:  06/27/24       Ankle strength 5/5

## 2024-04-18 DIAGNOSIS — E55.9 VITAMIN D DEFICIENCY: ICD-10-CM

## 2024-04-18 RX ORDER — ERGOCALCIFEROL 1.25 MG/1
1.25 CAPSULE ORAL
COMMUNITY

## 2024-04-19 RX ORDER — ERGOCALCIFEROL 1.25 MG/1
50000 CAPSULE ORAL
Qty: 4 CAPSULE | Refills: 0 | Status: SHIPPED | OUTPATIENT
Start: 2024-04-21 | End: 2024-07-14

## 2024-04-22 ENCOUNTER — APPOINTMENT (OUTPATIENT)
Dept: PHYSICAL THERAPY | Facility: CLINIC | Age: 59
End: 2024-04-22
Payer: COMMERCIAL

## 2024-04-29 ENCOUNTER — TREATMENT (OUTPATIENT)
Dept: PHYSICAL THERAPY | Facility: CLINIC | Age: 59
End: 2024-04-29
Payer: COMMERCIAL

## 2024-04-29 DIAGNOSIS — M76.70 PERONEAL TENDINITIS, UNSPECIFIED LATERALITY: Primary | ICD-10-CM

## 2024-04-29 PROCEDURE — 97110 THERAPEUTIC EXERCISES: CPT | Mod: GP,CQ

## 2024-04-29 ASSESSMENT — PAIN - FUNCTIONAL ASSESSMENT: PAIN_FUNCTIONAL_ASSESSMENT: 0-10

## 2024-04-29 ASSESSMENT — PAIN SCALES - GENERAL: PAINLEVEL_OUTOF10: 0 - NO PAIN

## 2024-04-29 NOTE — PROGRESS NOTES
"Physical Therapy    Physical Therapy Treatment    Patient Name: Monique Guerrero  MRN: [id number]  Today's Date: 4/29/2024  Time Calculation  Start Time: 1030  Stop Time: 1115  Time Calculation (min): 45 min  PT Therapeutic Procedures Time Entry  Therapeutic Exercise Time Entry: 45        Visit 4    Assessment:  PT Assessment  Evaluation/Treatment Tolerance: Patient tolerated treatment well  Good response by the patient overall with today's treatment session. Enjoyed the newly added exercises and balance training; patient's balance/stability on the right lower extremity has improved since starting therapy able to maintain SLS near 7 seconds, prior around 3 seconds. Ankle strengthening exercises performed today increases band from yellow to green. Patient feeling well when finishing this session, did not require icing time.     Plan:   Continue treatment per current POC  Current Problem  1. Peroneal tendinitis, unspecified laterality  Follow Up In Physical Therapy        General        Subjective    No pain. Compliant with HEP daily. Started walking the dog again, a couple days per week, shorter distances then working her way back up to 1 mile; no issues reported with recent walks.     Pain  Pain Assessment  Pain Assessment: 0-10  Pain Score: 0 - No pain    Objective     Treatments:  Therapeutic Exercise  Therapeutic Exercise Performed: Yes  Bike x 7 min   Calf raise INV/Ev x 20 each  Off step calf stretching x 1 min  Soleus raise x 15  Mini squat on foam x 20  6\" eccentric step downs x 10  resisted ankle DF, INV, EV with GTB theraband x 20  Tandem walking in // bar d/b x 2  Walking march with 3 sec pause in SLS d/b x 2 in // bar  SLS 10 x 5 sec each  DF/Peroneal stretching 2 x 20 sec   Heel walking in // bars d/b x 3    (Not today)  CP to the right ankle x 6 minutes    OP EDUCATION:     Goals:  Active       PT Problem       PT Goal 1       Start:  03/27/24    Expected End:  06/27/24       Ankle pain 3/10 or less    "      PT Goal 2       Start:  03/27/24    Expected End:  06/27/24       Improve LEFS by 20%         PT Goal 3       Start:  03/27/24    Expected End:  06/27/24       Normal gait          PT Goal 4       Start:  03/27/24    Expected End:  06/27/24       Ankle strength 5/5

## 2024-05-06 ENCOUNTER — TREATMENT (OUTPATIENT)
Dept: PHYSICAL THERAPY | Facility: CLINIC | Age: 59
End: 2024-05-06
Payer: COMMERCIAL

## 2024-05-06 DIAGNOSIS — M76.70 PERONEAL TENDINITIS, UNSPECIFIED LATERALITY: ICD-10-CM

## 2024-05-06 PROCEDURE — 97110 THERAPEUTIC EXERCISES: CPT | Mod: GP | Performed by: PHYSICAL THERAPIST

## 2024-05-06 NOTE — PROGRESS NOTES
Physical Therapy    Physical Therapy Treatment    Patient Name: Monique Guerrero  MRN: [id number]  Today's Date: 5/6/2024  Time Calculation  Start Time: 1025  Stop Time: 1105  Time Calculation (min): 40 min  PT Therapeutic Procedures Time Entry  Therapeutic Exercise Time Entry: 40        Visit 5    Assessment: Patient seen in PT for 5 visits for peroneal tendinitis.  Patient with good improvement towards PT goals but still has INV weakness on right.  Patient encouraged to try treadmill 15-20 minutes at let me know how she does with walking on treadmill next visit.  Patient advised to stop if it is painful.   Icing for left knee pain      Plan:   Continue treatment per current POC    Current Problem  1. Peroneal tendinitis, unspecified laterality  Follow Up In Physical Therapy        General        Subjective    0-1/10 ankle pain at worst last 2 days  Left knee pain after squatting down to get dog food  Patient has not tried going back on treadmill since injury.      Pain       Objective     Normal gait   Out toeing on right   LEFS =95%    Treatments:     Bike x 10 min   Calf raise INV/Ev x 20 each  Off step calf stretching x 1 min  Soleus raise x 15  Mini squat on foam x 20  Step up 20X   Steamboats on blue foam 10X each  resisted ankle, INV, with GTB theraband x 20  SLS on foam 10 x 5 sec each  40 minutes    (Not today)  CP to the right ankle x 6 minutes    OP EDUCATION:     Goals:  Active       PT Problem       PT Goal 1       Start:  03/27/24    Expected End:  06/27/24       Ankle pain 3/10 or less         PT Goal 2       Start:  03/27/24    Expected End:  06/27/24       Improve LEFS by 20%         PT Goal 3       Start:  03/27/24    Expected End:  06/27/24       Normal gait          PT Goal 4       Start:  03/27/24    Expected End:  06/27/24       Ankle strength 5/5

## 2024-05-13 ENCOUNTER — TREATMENT (OUTPATIENT)
Dept: PHYSICAL THERAPY | Facility: CLINIC | Age: 59
End: 2024-05-13
Payer: COMMERCIAL

## 2024-05-13 DIAGNOSIS — M76.70 PERONEAL TENDINITIS, UNSPECIFIED LATERALITY: ICD-10-CM

## 2024-05-13 PROCEDURE — 97110 THERAPEUTIC EXERCISES: CPT | Mod: GP | Performed by: PHYSICAL THERAPIST

## 2024-05-13 NOTE — PROGRESS NOTES
Physical Therapy    Physical Therapy Treatment    Patient Name: Monique Guerrero  MRN: [id number]  Today's Date: 5/13/2024              Visit 6    Assessment: Patient seen in PT for 6 visits for peroneal tendinitis.  Patient has met all goals except ankle weakness in INV.  Discharge to HEP.       Plan:  Discharge to HEP.     Current Problem  1. Peroneal tendinitis, unspecified laterality  Follow Up In Physical Therapy        General        Subjective    0-1/10 ankle pain at worst last 2 days  Left knee pain better  Patient has tried treadmill 20 minutes at 3 mph    Pain       Objective     Normal gait   Out toeing on right   LEFS =95%    Treatments:     [employer] stepper x 10 min   Calf raise INV/Ev x 20 each  Off step calf stretching 10X - hold 5 sec  Mini squat on foam x 20  Step up 20X   Steamboats on blue foam 10X each  resisted ankle, INV, with GTB theraband x 20  SLS on foam 10 x 5 sec each  40 minutes    (Not today)  CP to the right ankle x 6 minutes    OP EDUCATION:     Goals:  Active       PT Problem       PT Goal 1       Start:  03/27/24    Expected End:  06/27/24       Ankle pain 3/10 or less         PT Goal 2       Start:  03/27/24    Expected End:  06/27/24       Improve LEFS by 20%         PT Goal 3       Start:  03/27/24    Expected End:  06/27/24       Normal gait          PT Goal 4       Start:  03/27/24    Expected End:  06/27/24       Ankle strength 5/5

## 2024-08-15 DIAGNOSIS — U07.1 ACUTE COVID-19: Primary | ICD-10-CM

## 2024-08-15 RX ORDER — NIRMATRELVIR AND RITONAVIR 300-100 MG
3 KIT ORAL 2 TIMES DAILY
Qty: 30 TABLET | Refills: 0 | Status: SHIPPED | OUTPATIENT
Start: 2024-08-15 | End: 2024-08-20

## 2024-09-24 ENCOUNTER — LAB (OUTPATIENT)
Dept: LAB | Facility: LAB | Age: 59
End: 2024-09-24
Payer: COMMERCIAL

## 2024-09-24 DIAGNOSIS — E55.9 VITAMIN D DEFICIENCY: ICD-10-CM

## 2024-09-24 DIAGNOSIS — E78.5 HYPERLIPIDEMIA, UNSPECIFIED HYPERLIPIDEMIA TYPE: ICD-10-CM

## 2024-09-24 LAB
25(OH)D3 SERPL-MCNC: 43 NG/ML (ref 30–100)
ALT SERPL W P-5'-P-CCNC: 21 U/L (ref 7–45)
CHOLEST SERPL-MCNC: 181 MG/DL (ref 0–199)
CHOLESTEROL/HDL RATIO: 3.5
HDLC SERPL-MCNC: 51.5 MG/DL
LDLC SERPL CALC-MCNC: 88 MG/DL
NON HDL CHOLESTEROL: 130 MG/DL (ref 0–149)
TRIGL SERPL-MCNC: 207 MG/DL (ref 0–149)
VLDL: 41 MG/DL (ref 0–40)

## 2024-09-24 PROCEDURE — 80061 LIPID PANEL: CPT

## 2024-09-24 PROCEDURE — 84460 ALANINE AMINO (ALT) (SGPT): CPT

## 2024-09-24 PROCEDURE — 36415 COLL VENOUS BLD VENIPUNCTURE: CPT

## 2024-09-24 PROCEDURE — 82306 VITAMIN D 25 HYDROXY: CPT

## 2024-09-25 ENCOUNTER — APPOINTMENT (OUTPATIENT)
Dept: PRIMARY CARE | Facility: CLINIC | Age: 59
End: 2024-09-25
Payer: COMMERCIAL

## 2024-09-25 VITALS — DIASTOLIC BLOOD PRESSURE: 85 MMHG | BODY MASS INDEX: 31.76 KG/M2 | WEIGHT: 185 LBS | SYSTOLIC BLOOD PRESSURE: 133 MMHG

## 2024-09-25 DIAGNOSIS — J31.0 RHINITIS, UNSPECIFIED TYPE: ICD-10-CM

## 2024-09-25 DIAGNOSIS — E55.9 VITAMIN D DEFICIENCY: ICD-10-CM

## 2024-09-25 DIAGNOSIS — G47.9 SLEEP DISTURBANCE: Primary | ICD-10-CM

## 2024-09-25 DIAGNOSIS — Z23 FLU VACCINE NEED: ICD-10-CM

## 2024-09-25 DIAGNOSIS — E78.5 HYPERLIPIDEMIA, UNSPECIFIED HYPERLIPIDEMIA TYPE: ICD-10-CM

## 2024-09-25 PROCEDURE — 90656 IIV3 VACC NO PRSV 0.5 ML IM: CPT | Performed by: INTERNAL MEDICINE

## 2024-09-25 PROCEDURE — 90471 IMMUNIZATION ADMIN: CPT | Performed by: INTERNAL MEDICINE

## 2024-09-25 PROCEDURE — 1036F TOBACCO NON-USER: CPT | Performed by: INTERNAL MEDICINE

## 2024-09-25 PROCEDURE — 99214 OFFICE O/P EST MOD 30 MIN: CPT | Performed by: INTERNAL MEDICINE

## 2024-09-25 RX ORDER — AZELASTINE 1 MG/ML
2 SPRAY, METERED NASAL EVERY 12 HOURS
COMMUNITY
Start: 2023-03-23 | End: 2024-09-25 | Stop reason: SDUPTHER

## 2024-09-25 RX ORDER — AZELASTINE 1 MG/ML
2 SPRAY, METERED NASAL EVERY 12 HOURS
Qty: 30 ML | Refills: 0 | Status: SHIPPED | OUTPATIENT
Start: 2024-09-25

## 2024-09-25 ASSESSMENT — COLUMBIA-SUICIDE SEVERITY RATING SCALE - C-SSRS
2. HAVE YOU ACTUALLY HAD ANY THOUGHTS OF KILLING YOURSELF?: NO
6. HAVE YOU EVER DONE ANYTHING, STARTED TO DO ANYTHING, OR PREPARED TO DO ANYTHING TO END YOUR LIFE?: NO
1. IN THE PAST MONTH, HAVE YOU WISHED YOU WERE DEAD OR WISHED YOU COULD GO TO SLEEP AND NOT WAKE UP?: NO

## 2024-09-25 ASSESSMENT — ENCOUNTER SYMPTOMS
DEPRESSION: 0
LOSS OF SENSATION IN FEET: 0
OCCASIONAL FEELINGS OF UNSTEADINESS: 0

## 2024-09-25 ASSESSMENT — PATIENT HEALTH QUESTIONNAIRE - PHQ9
2. FEELING DOWN, DEPRESSED OR HOPELESS: NOT AT ALL
SUM OF ALL RESPONSES TO PHQ9 QUESTIONS 1 AND 2: 0
1. LITTLE INTEREST OR PLEASURE IN DOING THINGS: NOT AT ALL

## 2024-09-25 NOTE — PATIENT INSTRUCTIONS
Reflux  diet modification avoid tomato based sauces , spicy food , large meals  ,  avoid reclining within 3 hours after eating     Go back to taking the fish oil capsules 1000 mg in am and cut back on bread pasta rice potatoes sweets to lower your triglycerides     Try extended or controlled release melatonin to help sleep

## 2024-09-25 NOTE — PROGRESS NOTES
Subjective   Patient ID: Monique Guerrero is a 59 y.o. female who presents for fuv.    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   TG up but LDL stayed good and low   Started getting issues with reflux at night so taking tums   Now some salad dressing bother her , eats dinner was eating later  6 pm    and lies down watch tv     Review of Systems  General: Denies fever, chills, night sweats,  Eyes: Negative for recent visual changes  Ears, Nose, Throat :  Negative for hearing changes, sinus discomfort  Dermatologic: Negative for new skin conditions, rash  Respiratory: Negative for wheezing, shortness of breath, cough  Cardiovascular: Negative for chest pain, palpitations, or leg swelling  Gastrointestinal: Negative for nausea/vomiting, abdominal pain, changes in bowel habits  Genitourinary Negative for Urinary Incontinence  urgency , frequency, discomfort   Musculoskeletal: see hpi  Neurological: Negative for headaches, dizziness    Previous history  Past Medical History:   Diagnosis Date    Allergic rhinitis, unspecified 05/08/2020    Allergic rhinitis    Chronic rhinitis 05/08/2020    Rhinitis    Chronic rhinitis     Rhinitis, chronic    COVID-19 12/01/2022    COVID-19 virus infection    Encounter for immunization 11/13/2020    Need for shingles vaccine    Overweight 01/14/2022    Overweight    Personal history of other diseases of the circulatory system     History of varicose veins of lower extremity    Personal history of other malignant neoplasm of skin     History of basal cell carcinoma (BCC)    Personal history of other medical treatment      History of mammogram    Personal history of other venous thrombosis and embolism     H/O blood clots    Pure hyperglyceridemia 2022    High triglycerides    Rosacea, unspecified 2022    Rosacea    Varicella without complication     Varicella without complication     Past Surgical History:   Procedure Laterality Date     SECTION, CLASSIC  2014     Section    OTHER SURGICAL HISTORY  2017    Venous Sclerosing By Injection    OTHER SURGICAL HISTORY  2018    Endovenous Ablation Of Incompetent Vein Laser    OTHER SURGICAL HISTORY  2017    Leg Repair     Social History     Tobacco Use    Smoking status: Never    Smokeless tobacco: Never   Vaping Use    Vaping status: Never Used   Substance Use Topics    Alcohol use: Never    Drug use: Never     Family History   Problem Relation Name Age of Onset    Heart disease Mother      Hyperlipidemia Father      Hypertension Father      Diabetes Father      Hyperlipidemia Maternal Grandmother      Hyperlipidemia Paternal Grandmother      Lung cancer Paternal Grandfather       Allergies   Allergen Reactions    Delsym Rash    Dextromethorphan Hbr Rash    Pseudoephedrine Rash     fever, rash     Current Outpatient Medications   Medication Instructions    azelastine (Astelin) 137 mcg (0.1 %) nasal spray 2 sprays, Each Nostril, Every 12 hours    CALCIUM CITRATE-VITAMIN D3 ORAL oral    cetirizine (ZYRTEC) 10 mg, oral, Daily RT    ergocalciferol (VITAMIN D-2) 1.25 mg, oral, Once Weekly    fish oil concentrate (Omega-3) 120-180 mg capsule oral    flunisolide (Nasalide) 25 mcg (0.025 %) spray,non-aerosol nasal, Daily RT    fluticasone (Flonase) 50 mcg/actuation nasal spray 2 sprays, Does not apply, Daily RT    metroNIDAZOLE (Metrolotion) 0.75 % lotion lotion Apply sparingly twice daily.    multivitamin tablet oral    traZODone (DESYREL) 50 mg, oral, Nightly       Objective       Physical Exam  Vital Signs: as recorded above  General: Well  groomed, well nourished   Orientation:  Alert , oriented to time, place , and person   Mood and Affect:  Cooperative , no apparent distress normal affect  Skin: Good color, good turgor  Eyes: Extra ocular muscle movements intact, anicteric sclerae  Neck: Supple, full range of movement  Chest: Normal breath sounds, normal chest wall exam, symmetric, good air entry, clear to auscultation  Heart: Regular rate and rhythm, without murmur, gallop, or rubs  Abdomen soft nontender no masses felt no hepatosplenomegaly, no rebound or guarding  BACK:  no CTLS spine tenderness, no flank tenderness  Extremities: full range of movement  bilateral UE and bilateral LE,  no lower extremity edema  Neurological: Alert, oriented, cranial nerves II-XII grossly intact except for visual acuity  Sensation:  Intact   Gait: normal steady      Assessment/Plan   Monique Guerrero is a 59 y.o. female who presents for the concerns below:    Problem List Items Addressed This Visit    None    Reflux Plan:   diet modification weight control reflux precautions  , if any change in pattern will consider gastroenterology consult. Patient follow-up as discussed.    HYPERCHOLESTEROLEMIA PLAN: stable  continue current medications  Follow low cholesterol diet, encouraged high omega 3 fatty acid intake in diet, exercise, weight control. . Will Obtain AST/ALT, fasting lipid profile, creatine kinase  on statin if not done within past 3-6 months . Coenzyme Q10 200 mg a day if able to help increase HDL.    VITAMIN D DEFICIENCY low levels in the past  ,will check  if below 26 will restart high dose again for another course , if normal will continue dialy dose  Vitamin D3 , caution on other supplements or medications that may go through liver      SLEEP DISTURBANCE PLAN:  goal is 7-9 hours of sleep contributes to optimal physical and brain health.    Sleep hygiene, establish routine and set fixed time to sleep, avoid napping during the day, avoid, caffeine ,   alcohol and heavy, spicy, or sugary foods 4-6 hours before bedtime  Block out all distractions by turning off electronic devices i.e tv , computer, tablets, phone , radio    .  white noise generator if needed, Establish a pre-sleep ritual i.e warm bath , reading books  meditation relaxation breathing techniques   ,       Discussed with:   Return in :  4 months     Portions of this note were generated using digital voice recognition software, and may contain grammatical errors       Aurora Winn MD  09/25/24  1:50 PM

## 2024-11-22 DIAGNOSIS — E55.9 VITAMIN D DEFICIENCY: Primary | ICD-10-CM

## 2024-11-22 RX ORDER — ERGOCALCIFEROL 1.25 MG/1
1.25 CAPSULE ORAL
Qty: 12 CAPSULE | Refills: 0 | Status: SHIPPED | OUTPATIENT
Start: 2024-11-24

## 2024-12-06 ENCOUNTER — APPOINTMENT (OUTPATIENT)
Dept: OBSTETRICS AND GYNECOLOGY | Facility: CLINIC | Age: 59
End: 2024-12-06
Payer: COMMERCIAL

## 2024-12-06 VITALS
DIASTOLIC BLOOD PRESSURE: 76 MMHG | HEIGHT: 64 IN | BODY MASS INDEX: 32.06 KG/M2 | WEIGHT: 187.8 LBS | SYSTOLIC BLOOD PRESSURE: 126 MMHG

## 2024-12-06 DIAGNOSIS — Z01.419 ENCOUNTER FOR WELL WOMAN EXAM WITH ROUTINE GYNECOLOGICAL EXAM: Primary | ICD-10-CM

## 2024-12-06 DIAGNOSIS — Z12.31 ENCOUNTER FOR SCREENING MAMMOGRAM FOR BREAST CANCER: ICD-10-CM

## 2024-12-06 PROCEDURE — 3008F BODY MASS INDEX DOCD: CPT | Performed by: OBSTETRICS & GYNECOLOGY

## 2024-12-06 PROCEDURE — 1036F TOBACCO NON-USER: CPT | Performed by: OBSTETRICS & GYNECOLOGY

## 2024-12-06 PROCEDURE — 99396 PREV VISIT EST AGE 40-64: CPT | Performed by: OBSTETRICS & GYNECOLOGY

## 2024-12-06 RX ORDER — FEXOFENADINE HCL 60 MG/1
60 TABLET, FILM COATED ORAL DAILY
COMMUNITY

## 2024-12-06 RX ORDER — CALCIUM CARBONATE/VITAMIN D3 600MG-5MCG
TABLET ORAL
COMMUNITY

## 2024-12-06 NOTE — PROGRESS NOTES
"  ASSESSMENT/PLAN    1. Encounter for well woman exam with routine gynecological exam (Primary)  Routine well woman visit.   Your exam was normal today.   A pap test was not done today. Last pap  was normal and HPV negative.    2. Encounter for screening mammogram for breast cancer  Your clinical breast exam was normal.   A screening mammogram order has been placed.   Please schedule your mammogram.     Declines bone density at this time.      SUBJECTIVE    PAP 22 neg hpv neg   MAMMO 23   DEXA never   COLON 2018     HPI        60 yo  for routine well woman visit.    Last visit 2023 for RA.  Menopausal since age 48. Denies any bleeding.  Tendonitis in ankle this year.   Denies any other intervening medical or surgical issues.   , no smoke, occ ETOH.  Continues with challenges related to aging parents and health issues.        REVIEW OF SYSTEMS    Constitutional: no fever, no chills, + weight gain, no recent weight loss, no fatigue.   ENT: no hearing loss  Cardiovascular: no chest pain, no palpitations.  Respiratory: no shortness of breath,   Gastrointestinal: no abdominal pain, no constipation, no nausea, no diarrhea  Genitourinary: no pelvic pain, no dysuria, no urinary incontinence, no change in urinary frequency, no vaginal dryness, no vaginal itching,  no vaginal discharge, no unexplained vaginal bleeding, no lesion/sore.   Musculoskeletal: no back pain,  Integumentary: no rashes, no skin lesions, no breast pain, no nipple discharge, no breast lump.   Neurological: no headache  Psychiatric: no sleep disturbances, no anxiety, no depression.   Endocrine: no hot flashes, no loss of hair  Hematologic/Lymphatic: no swollen glands      OBJECTIVE    /76   Ht 1.626 m (5' 4\")   Wt 85.2 kg (187 lb 12.8 oz)   BMI 32.24 kg/m²     Physical Exam     Constitutional: Alert and in no acute distress. Well developed, well nourished   Head and Face: Head and face: normal   Eyes: Normal external " exam - nonicteric sclera.  Ears, Nose, Mouth, and Throat: External inspection of ears and nose: normal   Neck: no neck asymmetry. Supple and thyroid not enlarged and there were no palpable thyroid nodules   Cardiovascular: Heart rate and rhythm were normal  Pulmonary: No respiratory distress and clear bilateral breath sounds   Chest: Breasts: normal appearance, no nipple discharge, no skin changes. Palpation of breasts and axillae: no palpable mass, no axillary lymphadenopathy   Abdomen: soft nontender; no abdominal mass palpated, no organomegaly and no hernias   Genitourinary: external genitalia: normal appearing vulva and labia without lesions. No inguinal lymphadenopathy,    Urethra: normal.   Bladder: normal on palpation.   Perianal area: normal   Vagina: normal, without significant discharge, no lesions.  Cervix: Normal appearing without lesions.  Uterus: Normal, mobile, nontender.  Right and left adnexa/parametria: Normal  Skin: normal skin color and pigmentation, normal skin turgor, and no rash  Psychiatric: alert and oriented x 3., affect normal to patient baseline and mood: appropriate        Lillie Beck MD

## 2025-01-09 ENCOUNTER — HOSPITAL ENCOUNTER (OUTPATIENT)
Dept: RADIOLOGY | Facility: CLINIC | Age: 60
Discharge: HOME | End: 2025-01-09
Payer: COMMERCIAL

## 2025-01-09 VITALS — HEIGHT: 64 IN | WEIGHT: 187.83 LBS | BODY MASS INDEX: 32.07 KG/M2

## 2025-01-09 DIAGNOSIS — Z12.31 ENCOUNTER FOR SCREENING MAMMOGRAM FOR BREAST CANCER: ICD-10-CM

## 2025-01-09 PROCEDURE — 77067 SCR MAMMO BI INCL CAD: CPT

## 2025-01-23 ENCOUNTER — APPOINTMENT (OUTPATIENT)
Dept: PRIMARY CARE | Facility: CLINIC | Age: 60
End: 2025-01-23
Payer: COMMERCIAL

## 2025-08-13 ENCOUNTER — TELEPHONE (OUTPATIENT)
Dept: OBSTETRICS AND GYNECOLOGY | Facility: CLINIC | Age: 60
End: 2025-08-13
Payer: COMMERCIAL